# Patient Record
Sex: MALE | Employment: FULL TIME | URBAN - METROPOLITAN AREA
[De-identification: names, ages, dates, MRNs, and addresses within clinical notes are randomized per-mention and may not be internally consistent; named-entity substitution may affect disease eponyms.]

---

## 2021-09-22 ENCOUNTER — ANCILLARY PROCEDURE (OUTPATIENT)
Dept: GENERAL RADIOLOGY | Facility: CLINIC | Age: 46
End: 2021-09-22
Attending: PHYSICIAN ASSISTANT
Payer: COMMERCIAL

## 2021-09-22 ENCOUNTER — OFFICE VISIT (OUTPATIENT)
Dept: FAMILY MEDICINE | Facility: CLINIC | Age: 46
End: 2021-09-22
Payer: COMMERCIAL

## 2021-09-22 VITALS
WEIGHT: 176.6 LBS | DIASTOLIC BLOOD PRESSURE: 81 MMHG | TEMPERATURE: 96.6 F | SYSTOLIC BLOOD PRESSURE: 124 MMHG | RESPIRATION RATE: 14 BRPM | HEIGHT: 73 IN | OXYGEN SATURATION: 98 % | BODY MASS INDEX: 23.4 KG/M2 | HEART RATE: 55 BPM

## 2021-09-22 DIAGNOSIS — R07.9 CHEST PAIN, UNSPECIFIED TYPE: ICD-10-CM

## 2021-09-22 DIAGNOSIS — Z13.220 SCREENING FOR HYPERLIPIDEMIA: ICD-10-CM

## 2021-09-22 DIAGNOSIS — R07.9 CHEST PAIN, UNSPECIFIED TYPE: Primary | ICD-10-CM

## 2021-09-22 LAB
ANION GAP SERPL CALCULATED.3IONS-SCNC: 3 MMOL/L (ref 3–14)
BUN SERPL-MCNC: 14 MG/DL (ref 7–30)
CALCIUM SERPL-MCNC: 9.3 MG/DL (ref 8.5–10.1)
CHLORIDE BLD-SCNC: 106 MMOL/L (ref 94–109)
CHOLEST SERPL-MCNC: 174 MG/DL
CO2 SERPL-SCNC: 30 MMOL/L (ref 20–32)
CREAT SERPL-MCNC: 1.07 MG/DL (ref 0.66–1.25)
ERYTHROCYTE [DISTWIDTH] IN BLOOD BY AUTOMATED COUNT: 12.7 % (ref 10–15)
FASTING STATUS PATIENT QL REPORTED: YES
GFR SERPL CREATININE-BSD FRML MDRD: 83 ML/MIN/1.73M2
GLUCOSE BLD-MCNC: 87 MG/DL (ref 70–99)
HCT VFR BLD AUTO: 46.5 % (ref 40–53)
HDLC SERPL-MCNC: 56 MG/DL
HGB BLD-MCNC: 15.7 G/DL (ref 13.3–17.7)
LDLC SERPL CALC-MCNC: 100 MG/DL
MCH RBC QN AUTO: 30.7 PG (ref 26.5–33)
MCHC RBC AUTO-ENTMCNC: 33.8 G/DL (ref 31.5–36.5)
MCV RBC AUTO: 91 FL (ref 78–100)
NONHDLC SERPL-MCNC: 118 MG/DL
PLATELET # BLD AUTO: 193 10E3/UL (ref 150–450)
POTASSIUM BLD-SCNC: 4.9 MMOL/L (ref 3.4–5.3)
RBC # BLD AUTO: 5.12 10E6/UL (ref 4.4–5.9)
SODIUM SERPL-SCNC: 139 MMOL/L (ref 133–144)
TRIGL SERPL-MCNC: 89 MG/DL
TROPONIN I SERPL-MCNC: <0.015 UG/L (ref 0–0.04)
WBC # BLD AUTO: 4.2 10E3/UL (ref 4–11)

## 2021-09-22 PROCEDURE — 84484 ASSAY OF TROPONIN QUANT: CPT | Performed by: PHYSICIAN ASSISTANT

## 2021-09-22 PROCEDURE — 36415 COLL VENOUS BLD VENIPUNCTURE: CPT | Performed by: PHYSICIAN ASSISTANT

## 2021-09-22 PROCEDURE — 85027 COMPLETE CBC AUTOMATED: CPT | Performed by: PHYSICIAN ASSISTANT

## 2021-09-22 PROCEDURE — 80048 BASIC METABOLIC PNL TOTAL CA: CPT | Performed by: PHYSICIAN ASSISTANT

## 2021-09-22 PROCEDURE — 93000 ELECTROCARDIOGRAM COMPLETE: CPT | Performed by: PHYSICIAN ASSISTANT

## 2021-09-22 PROCEDURE — 71046 X-RAY EXAM CHEST 2 VIEWS: CPT | Performed by: RADIOLOGY

## 2021-09-22 PROCEDURE — 80061 LIPID PANEL: CPT | Performed by: PHYSICIAN ASSISTANT

## 2021-09-22 PROCEDURE — 99204 OFFICE O/P NEW MOD 45 MIN: CPT | Performed by: PHYSICIAN ASSISTANT

## 2021-09-22 ASSESSMENT — PAIN SCALES - GENERAL: PAINLEVEL: NO PAIN (1)

## 2021-09-22 ASSESSMENT — MIFFLIN-ST. JEOR: SCORE: 1737.92

## 2021-09-22 NOTE — PATIENT INSTRUCTIONS
Darrick Fry,    Thank you for allowing [facility] to manage your care.    I am unsure of the cause of your symptoms, but your exam is reassuring. We will see what our workup shows.     If you develop worsening/changing symptoms at any time, please call 911 or go to the emergency department for evaluation.    I ordered some lab work, please go to the laboratory to get your studies.    I ordered some xrays, please go to our radiology department to get your xrays.    Please allow 1-2 business days for our office to contact you in regards to your laboratory/radiological studies.  If not done so, I encourage you to login into [employer] ([email or web address] to review your results as well.     For your pain, please use Ibuprofen 400mg four times daily with food. Between ibuprofen doses, you may use Tylenol 650mg.     Max acetaminophen (Tylenol) 4,000mg/24 hours  Max ibuprofen 3,200mg/24 hours      If you have any questions or concerns, please feel free to call us at [phone]    Sincerely,    Edin Severino PA-C    Did you know?      You can schedule a video visit for follow-up appointments as well as future appointments for certain conditions.  Please see the below link.     [email or web address]    If you have not already done so,  I encourage you to sign up for [facility] ([email or web address]).  This will allow you to review your results, securely communicate with a provider, and schedule virtual visits as well.      Patient Education     Uncertain Causes of Chest Pain    Chest pain can happen for a number of reasons. Sometimes the cause can't be determined. If your condition does not seem serious, and your pain does not appear to be coming from your heart, your healthcare provider may recommend watching it closely. Sometimes the signs of a serious problem take more time to appear. Many problems not related to your heart can cause chest pain.  These include:    Musculoskeletal. Costochondritis is an inflammation of the tissues around the ribs that can occur from trauma or overuse injuries, or a strain of the muscles of the chest wall    Respiratory. Pneumonia, collapsed lung (pneumothorax), or inflammation of the lining of the chest and lungs (pleurisy)    Gastrointestinal. Esophageal reflux, heartburn, ulcers, or gallbladder disease    Anxiety and panic disorders    Nerve compression and inflammation    Rare miscellaneous problems such as aortic aneurysm (a swelling of the large artery coming out of the heart) or pulmonary embolism (a blood clot in the lungs)  Home care  After your visit, follow these recommendations:    Rest today and avoid strenuous activity.    Take any prescribed medicine as directed.    Be aware of any recurrent chest pain and notice any changes  Follow-up care  Follow up with your healthcare provider if you do not start to feel better within 24 hours, or as advised.  Call 911  Call 911 if any of these occur:    A change in the type of pain: if it feels different, becomes more severe, lasts longer, or begins to spread into your shoulder, arm, neck, jaw or back    Shortness of breath or increased pain with breathing    Weakness, dizziness, or fainting    Rapid heart beat    Crushing sensation in your chest  When to seek medical advice  Call your healthcare provider right away if any of the following occur:    Cough with dark colored sputum (phlegm) or blood    Fever of 100.4 F (38 C) or higher, or as directed by your healthcare provider    Swelling, pain or redness in one leg  Nisreen last reviewed this educational content on 5/1/2018 2000-2021 [employer]. All rights reserved. This information is not intended as a substitute for professional medical care. Always follow your healthcare professional's instructions.      We are continuing to schedule all people age 12 and older for COVID-19 vaccines (patients age  12-17 can only use the Pfizer vaccine).    After Tuesday, Aug. 24, we are offering third doses of the Pfizer and Moderna COVID-19 vaccines to moderately and severely immunocompromised patients. Qualified patients can schedule their third dose vaccine appointment via [facility] or by walking in to one of our retail pharmacies to receive the vaccine - no appointment needed. We will also be sending messages in [facility] or through the mail to patients that we have identified as immunocompromised per CDC criteria to allow them easy access to schedule their appointment.    We are not yet providing third shots of COVID-19 vaccine to patients who are not immunocompromised. Please check back in the coming days for more information on when these may become available.       To schedule your 1st dose appointment, please log in to [facility] using this link to see when and where we have openings.     To schedule your additional dose appointment for immunocompromised patients, please log in to [facility] [employer] using this link to see when and where we have openings.    If you have technical difficulty using [employer], call [phone], option 1 for assistance.    More information about vaccine effectiveness at reducing spread of disease, hospitalizations, and death as well as vaccine safety and answers to other questions can be found on our website: [email or web address].

## 2021-09-22 NOTE — PROGRESS NOTES
Assessment & Plan   Problem List Items Addressed This Visit     None      Visit Diagnoses     Chest pain    -  Primary    Relevant Orders    EKG 12-lead complete w/read - Clinics (Completed)    Lipid panel reflex to direct LDL Fasting    XR Chest 2 Views (Completed)    CBC with platelets (Completed)    Basic metabolic panel  (Ca, Cl, CO2, Creat, Gluc, K, Na, BUN)    Troponin I    Screening for hyperlipidemia        Relevant Orders    Lipid panel reflex to direct LDL Fasting    Chest pain, unspecified type        Relevant Orders    Lipid panel reflex to direct LDL Fasting    XR Chest 2 Views (Completed)    CBC with platelets (Completed)    Basic metabolic panel  (Ca, Cl, CO2, Creat, Gluc, K, Na, BUN)    Troponin I         Impression is right lateral chest pain from uncertain but likely nonemergent etiology.  Patient states his discomfort is resolving and he considered not keeping this appointment today.  EKG was abnormal but I did speak with Dr. Ambriz of cardiology who was not concerned with the tracing.  CBC and chest x-ray show no evidence of anemia or worrisome cardiopulmonary finding.  Chemistry and troponin are pending, but I have low suspicion for acute coronary syndrome.  We did discuss screening for PE based on D-dimer, however the patient is low risk for PE on Wells criteria and is PERC rule negative.  He would prefer to not do this at this time.  I feel this is reasonable.  He did recently begin a new workout routine and this may be the cause of his symptoms in the form of musculoskeletal strain or other cause.  We did discuss the limitations of a clinic work-up and he acknowledged understanding of the risks of not going to the emergency department for his chest discomfort.  Follow up in one week if not improving.    Complete history and physical exam as below. AF with normal VS.    DDx and Dx discussed with and explained to the pt to their satisfaction.  All questions were answered at this time. Pt  "expressed understanding of and agreement with this dx, tx, and plan. No further workup warranted and standard medication warnings given. I have given the patient a list of pertinent indications for re-evaluation. Will go to the Emergency Department if symptoms worsen or new concerning symptoms arise. Patient left in no apparent distress.     35 minutes spent on the date of the encounter doing chart review, history and exam, documentation and further activities per the note    See Patient Instructions    Return in about 1 week (around 9/29/2021) for a recheck of your symptoms if not improving, or call 911/go to an ER anytime if worsening.    SIMI Viv[facility] [place] Fry is a 46 year old who presents for the following health issues     HPI     Chest Pain  Onset/Duration: 3 weeks ago  Description:   Location: underneath ribs, right side  Character: discomfort  Radiation: None  Duration: depending on breathing   Intensity: moderate  Progression of Symptoms: improving and intermittent  Accompanying Signs & Symptoms:  Shortness of breath: no  Sweating: no  Nausea/vomiting: no  Lightheadedness: no  Palpitations: no  Fever/Chills: no  Cough: no           Heartburn: no  History:   Family history of heart disease: grandpa heart attack  Tobacco use: YES- cigars  Previous similar symptoms: no   Precipitating factors:   Worse with exertion: no  Worse with deep breaths: YES           Related to eating: no           Better with burping: no  Alleviating factors: No  Therapies tried and outcome: No    No change in exercise tolerance. Pain not exertional. No history of PE/DVT, hormone use, CA. Had COVID 19 in April.     Review of Systems   Constitutional, HEENT, cardiovascular, pulmonary, gi and gu systems are negative, except as otherwise noted.      Objective    /81   Pulse 55   Temp (!) 96.6  F (35.9  C) (Tympanic)   Resp 14   Ht 1.851 m (6' 0.87\")   Wt 80.1 kg (176 lb " 9.6 oz)   SpO2 98%   BMI 23.38 kg/m    Body mass index is 23.38 kg/m .  Physical Exam  Vitals and nursing note reviewed.   Constitutional:       General: He is not in acute distress.     Appearance: He is not ill-appearing or diaphoretic.   HENT:      Head: Normocephalic and atraumatic.      Mouth/Throat:      Mouth: Mucous membranes are moist.   Eyes:      Conjunctiva/sclera: Conjunctivae normal.   Cardiovascular:      Rate and Rhythm: Normal rate and regular rhythm.      Heart sounds: Normal heart sounds. No murmur heard.   No friction rub. No gallop.       Comments: 2+ symmetric radial/PT pulses. No LE edema or tenderness.  Pulmonary:      Effort: Pulmonary effort is normal. No respiratory distress.      Breath sounds: Normal breath sounds. No stridor. No wheezing, rhonchi or rales.   Abdominal:      General: Bowel sounds are normal. There is no distension.      Palpations: Abdomen is soft. There is no mass.      Tenderness: There is no abdominal tenderness. There is no guarding or rebound.      Hernia: No hernia is present.   Skin:     General: Skin is warm and dry.   Neurological:      General: No focal deficit present.      Mental Status: He is alert. Mental status is at baseline.   Psychiatric:         Mood and Affect: Mood normal.         Behavior: Behavior normal.          Results for orders placed or performed in visit on 09/22/21   XR Chest 2 Views     Status: None    Narrative    XR CHEST 2 VW 9/22/2021 8:59 AM    HISTORY: Chest pain, unspecified type    COMPARISON: 2/2/2012      Impression    IMPRESSION: Clear lungs. No pleural effusion or pneumothorax. The  cardiac and mediastinal silhouettes are normal.    KIARRA CROUCH MD         SYSTEM ID:  [id number]   Results for orders placed or performed in visit on 09/22/21   CBC with platelets     Status: Normal   Result Value Ref Range    WBC Count 4.2 4.0 - 11.0 10e3/uL    RBC Count 5.12 4.40 - 5.90 10e6/uL    Hemoglobin 15.7 13.3 - 17.7 g/dL    Hematocrit  46.5 40.0 - 53.0 %    MCV 91 78 - 100 fL    MCH 30.7 26.5 - 33.0 pg    MCHC 33.8 31.5 - 36.5 g/dL    RDW 12.7 10.0 - 15.0 %    Platelet Count 193 150 - 450 10e3/uL     EKG: sinus rhythm with abnormal ST contour in leads I and II    Troponin pending

## 2021-09-25 ENCOUNTER — HEALTH MAINTENANCE LETTER (OUTPATIENT)
Age: 46
End: 2021-09-25

## 2022-12-01 ENCOUNTER — TELEPHONE (OUTPATIENT)
Dept: FAMILY MEDICINE | Facility: CLINIC | Age: 47
End: 2022-12-01

## 2022-12-01 NOTE — TELEPHONE ENCOUNTER
Patient calling to report what feels like a pulled muscle (right side of back, just under his rib cage).    Patient states the pain is a mild constant ache that he feels more when taking deep breaths, turning in bed or twisting.    Patient unaware of cause; denies injury, or abnormal appearance; denies any acute illness.    Patient is concerned because it has been over one month and pain has not gone away.  Scheduled with same day provider tomorrow.    Patient verbalized agreement and understanding.  Smitha Phillips RN

## 2022-12-02 ENCOUNTER — VIRTUAL VISIT (OUTPATIENT)
Dept: FAMILY MEDICINE | Facility: CLINIC | Age: 47
End: 2022-12-02
Payer: COMMERCIAL

## 2022-12-02 DIAGNOSIS — R10.9 RIGHT FLANK PAIN: Primary | ICD-10-CM

## 2022-12-02 DIAGNOSIS — M54.6 RIGHT-SIDED THORACIC BACK PAIN, UNSPECIFIED CHRONICITY: ICD-10-CM

## 2022-12-02 PROCEDURE — 99213 OFFICE O/P EST LOW 20 MIN: CPT | Mod: 95 | Performed by: PHYSICIAN ASSISTANT

## 2022-12-02 ASSESSMENT — ENCOUNTER SYMPTOMS
SHORTNESS OF BREATH: 0
HEMATURIA: 0
NERVOUS/ANXIOUS: 0
DIAPHORESIS: 0
COUGH: 0
DYSURIA: 0
FEVER: 0
FREQUENCY: 0
UNEXPECTED WEIGHT CHANGE: 0
ABDOMINAL PAIN: 0
LIGHT-HEADEDNESS: 0

## 2022-12-02 NOTE — PATIENT INSTRUCTIONS
Braulio Fry,     As your low back/flank pain has been ongoing for 2 months and has slightly worsened, I think we should complete some lab work for further evaluation.  I have placed lab orders.  Please call the [facility] at [phone] to schedule your lab appointment.  We will send results and next steps once available.     I would recommend trying ibuprofen a few times to see if this helps with your symptoms.  If you develop any severe symptoms such as chest pain, difficulty breathing, severe abdominal pain, fevers, or any other severe symptoms, please go to the emergency department.    Reach out with questions or concerns.    Take Care,  Marlen Navas PA-C

## 2022-12-02 NOTE — PROGRESS NOTES
Ang is a 47 year old who is being evaluated via a billable video visit.      How would you like to obtain your AVS? MyChart  If the video visit is dropped, the invitation should be resent by: Text to cell phone: [phone]  Will anyone else be joining your video visit? No      Assessment & Plan     Right flank pain  Right-sided thoracic back pain, unspecified chronicity  Patient is a 47-year-old male who presents to virtual visit due to right lower back/flank pain ongoing intermittently x2 months and slightly worsening.  Patient notes that occurs with movement.  No known injury.  Patient is generally healthy and is able to do all daily activities including running.  As pain has slightly worsened over the last 2 months and no known injury, low suspicion for muscle strain or spasm.  Differential diagnosis is broad includes kidney stone, hepatic, renal, gallbladder, pancreatic abnormalities.  Will complete labs for further evaluation.  Recommended trial of ibuprofen for pain management.  Discussed symptoms that warrant urgent/emergent follow-up.  - UA Macro with Reflex to Micro and Culture - lab collect; Future  - CBC with platelets; Future  - Comprehensive metabolic panel (BMP + Alb, Alk Phos, ALT, AST, Total. Bili, TP); Future  - Lipase; Future      See Patient Instructions    Return in about 1 week (around 12/9/2022), or if symptoms worsen or fail to improve.    Marlen Navas PA-C  [facility] [place] Fry is a 47 year old, presenting for the following health issues:  Musculoskeletal Problem      History of Present Illness       Reason for visit:  Localized mild discomfort in back  Symptom onset:  More than a month  Symptoms include:  Mild, but persistent, discomfort in right side of back, just below rib cage.  Symptom intensity:  Mild  Symptom progression:  Staying the same  Had these symptoms before:  No  What makes it worse:  Moving.  What makes it better:  Not moving    He  eats 2-3 servings of fruits and vegetables daily.He consumes 0 sweetened beverage(s) daily.He exercises with enough effort to increase his heart rate 30 to 60 minutes per day.  He exercises with enough effort to increase his heart rate 5 days per week.   He is taking medications regularly.     Patient notes he has localized back discomfort, no specifically pain, that is in the middle of the right back below the rib cage ongoing for over one month. Pain is intermittent, but does not affect daily activity. Pain occurs with twisting or movement. Pain is slight worse than when it started. No recent Uri's. No known injury. No prior surgeries. Patient notes pain was more pronounced after having three alcoholic drinks at [facility]. No family history of pancreatic cancer. No history of kidney stones.    Review of Systems   Constitutional: Negative for diaphoresis, fever and unexpected weight change.   HENT: Negative for congestion.    Respiratory: Negative for cough and shortness of breath.    Cardiovascular: Negative for chest pain.   Gastrointestinal: Negative for abdominal pain.   Genitourinary: Negative for dysuria, frequency, hematuria and urgency.   Skin: Negative for rash.   Neurological: Negative for light-headedness.   Psychiatric/Behavioral: The patient is not nervous/anxious.           Objective           Vitals:  No vitals were obtained today due to virtual visit.    Physical Exam   GENERAL: Healthy, alert and no distress  EYES: Eyes grossly normal to inspection.  No discharge or erythema, or obvious scleral/conjunctival abnormalities.  RESP: No audible wheeze, cough, or visible cyanosis.  No visible retractions or increased work of breathing.    SKIN: Visible skin clear. No significant rash, abnormal pigmentation or lesions.  NEURO: Cranial nerves grossly intact.  Mentation and speech appropriate for age.  PSYCH: Mentation appears normal, affect normal/bright, judgement and insight intact, normal speech and  appearance well-groomed.          Video-Visit Details    Video Start Time: 2:40 PM    Type of service:  Video Visit    Video End Time:2:55 PM    Originating Location (pt. Location): Home    Distant Location (provider location):  On-site    Platform used for Video Visit: Kaleb

## 2022-12-06 ENCOUNTER — LAB (OUTPATIENT)
Dept: LAB | Facility: CLINIC | Age: 47
End: 2022-12-06
Payer: COMMERCIAL

## 2022-12-06 DIAGNOSIS — M54.6 RIGHT-SIDED THORACIC BACK PAIN, UNSPECIFIED CHRONICITY: ICD-10-CM

## 2022-12-06 DIAGNOSIS — R10.9 RIGHT FLANK PAIN: ICD-10-CM

## 2022-12-06 LAB
ALBUMIN SERPL-MCNC: 3.7 G/DL (ref 3.4–5)
ALBUMIN UR-MCNC: NEGATIVE MG/DL
ALP SERPL-CCNC: 92 U/L (ref 40–150)
ALT SERPL W P-5'-P-CCNC: 28 U/L (ref 0–70)
ANION GAP SERPL CALCULATED.3IONS-SCNC: 3 MMOL/L (ref 3–14)
APPEARANCE UR: CLEAR
AST SERPL W P-5'-P-CCNC: 22 U/L (ref 0–45)
BILIRUB SERPL-MCNC: 0.4 MG/DL (ref 0.2–1.3)
BILIRUB UR QL STRIP: NEGATIVE
BUN SERPL-MCNC: 19 MG/DL (ref 7–30)
CALCIUM SERPL-MCNC: 8.8 MG/DL (ref 8.5–10.1)
CHLORIDE BLD-SCNC: 108 MMOL/L (ref 94–109)
CO2 SERPL-SCNC: 30 MMOL/L (ref 20–32)
COLOR UR AUTO: YELLOW
CREAT SERPL-MCNC: 1 MG/DL (ref 0.66–1.25)
ERYTHROCYTE [DISTWIDTH] IN BLOOD BY AUTOMATED COUNT: 12.7 % (ref 10–15)
GFR SERPL CREATININE-BSD FRML MDRD: >90 ML/MIN/1.73M2
GLUCOSE BLD-MCNC: 89 MG/DL (ref 70–99)
GLUCOSE UR STRIP-MCNC: NEGATIVE MG/DL
HCT VFR BLD AUTO: 42.5 % (ref 40–53)
HGB BLD-MCNC: 14.1 G/DL (ref 13.3–17.7)
HGB UR QL STRIP: NEGATIVE
KETONES UR STRIP-MCNC: NEGATIVE MG/DL
LEUKOCYTE ESTERASE UR QL STRIP: NEGATIVE
LIPASE SERPL-CCNC: 109 U/L (ref 73–393)
MCH RBC QN AUTO: 30.5 PG (ref 26.5–33)
MCHC RBC AUTO-ENTMCNC: 33.2 G/DL (ref 31.5–36.5)
MCV RBC AUTO: 92 FL (ref 78–100)
NITRATE UR QL: NEGATIVE
PH UR STRIP: 6 [PH] (ref 5–7)
PLATELET # BLD AUTO: 206 10E3/UL (ref 150–450)
POTASSIUM BLD-SCNC: 4.7 MMOL/L (ref 3.4–5.3)
PROT SERPL-MCNC: 7.2 G/DL (ref 6.8–8.8)
RBC # BLD AUTO: 4.63 10E6/UL (ref 4.4–5.9)
SODIUM SERPL-SCNC: 141 MMOL/L (ref 133–144)
SP GR UR STRIP: 1.01 (ref 1–1.03)
UROBILINOGEN UR STRIP-ACNC: 0.2 E.U./DL
WBC # BLD AUTO: 6.6 10E3/UL (ref 4–11)

## 2022-12-06 PROCEDURE — 36415 COLL VENOUS BLD VENIPUNCTURE: CPT

## 2022-12-06 PROCEDURE — 81003 URINALYSIS AUTO W/O SCOPE: CPT

## 2022-12-06 PROCEDURE — 80053 COMPREHEN METABOLIC PANEL: CPT

## 2022-12-06 PROCEDURE — 85027 COMPLETE CBC AUTOMATED: CPT

## 2022-12-06 PROCEDURE — 83690 ASSAY OF LIPASE: CPT

## 2023-04-22 ENCOUNTER — HEALTH MAINTENANCE LETTER (OUTPATIENT)
Age: 48
End: 2023-04-22

## 2023-08-14 ENCOUNTER — OFFICE VISIT (OUTPATIENT)
Dept: FAMILY MEDICINE | Facility: CLINIC | Age: 48
End: 2023-08-14
Payer: COMMERCIAL

## 2023-08-14 VITALS
WEIGHT: 194.4 LBS | DIASTOLIC BLOOD PRESSURE: 72 MMHG | TEMPERATURE: 97.8 F | HEART RATE: 54 BPM | SYSTOLIC BLOOD PRESSURE: 104 MMHG | OXYGEN SATURATION: 97 % | RESPIRATION RATE: 14 BRPM | HEIGHT: 73 IN | BODY MASS INDEX: 25.76 KG/M2

## 2023-08-14 DIAGNOSIS — Z71.89 ADVANCE DIRECTIVE DISCUSSED WITH PATIENT: ICD-10-CM

## 2023-08-14 DIAGNOSIS — L30.8 OTHER ECZEMA: Primary | ICD-10-CM

## 2023-08-14 PROCEDURE — 90715 TDAP VACCINE 7 YRS/> IM: CPT | Performed by: PHYSICIAN ASSISTANT

## 2023-08-14 PROCEDURE — 90471 IMMUNIZATION ADMIN: CPT | Performed by: PHYSICIAN ASSISTANT

## 2023-08-14 PROCEDURE — 99213 OFFICE O/P EST LOW 20 MIN: CPT | Mod: 25 | Performed by: PHYSICIAN ASSISTANT

## 2023-08-14 RX ORDER — TRIAMCINOLONE ACETONIDE 1 MG/G
CREAM TOPICAL 2 TIMES DAILY
Qty: 30 G | Refills: 3 | Status: SHIPPED | OUTPATIENT
Start: 2023-08-14

## 2023-08-14 ASSESSMENT — PAIN SCALES - GENERAL: PAINLEVEL: NO PAIN (0)

## 2023-08-14 NOTE — PATIENT INSTRUCTIONS
Darrick Fry,    Thank you for allowing [facility] to manage your care.    You likely have idiopathic eczema. The triamcinolone should work well, but on sensitive areas, you may want to use hydrocortisone 1% (Cortaid/Cortizone 10) instead. Try using Vaseline or moisturizing creams to prevent flares.    If you develop worsening/changing symptoms at any time, please be seen again in clinic, urgent care, or go to the emergency department for evaluation as we discussed.    I sent your prescriptions to your pharmacy.    If you have any questions or concerns, please feel free to call us at [phone]    Sincerely,    Edin Severino PA-C    Did you know?      You can schedule a video visit for follow-up appointments as well as future appointments for certain conditions.  Please see the below link.     [email or web address]    If you have not already done so,  I encourage you to sign up for [facility] ([email or web address]).  This will allow you to review your results, securely communicate with a provider, and schedule virtual visits as well.

## 2023-08-14 NOTE — PROGRESS NOTES
Prior to immunization administration, verified patients identity using patient s name and date of birth. Please see Immunization Activity for additional information.     Screening Questionnaire for Adult Immunization    Are you sick today?   No   Do you have allergies to medications, food, a vaccine component or latex?   No   Have you ever had a serious reaction after receiving a vaccination?   No   Do you have a long-term health problem with heart, lung, kidney, or metabolic disease (e.g., diabetes), asthma, a blood disorder, no spleen, complement component deficiency, a cochlear implant, or a spinal fluid leak?  Are you on long-term aspirin therapy?   No   Do you have cancer, leukemia, HIV/AIDS, or any other immune system problem?   No   Do you have a parent, brother, or sister with an immune system problem?   No   In the past 3 months, have you taken medications that affect  your immune system, such as prednisone, other steroids, or anticancer drugs; drugs for the treatment of rheumatoid arthritis, Crohn s disease, or psoriasis; or have you had radiation treatments?   No   Have you had a seizure, or a brain or other nervous system problem?   No   During the past year, have you received a transfusion of blood or blood    products, or been given immune (gamma) globulin or antiviral drug?   No   For women: Are you pregnant or is there a chance you could become       pregnant during the next month?   No   Have you received any vaccinations in the past 4 weeks?   No     Immunization questionnaire answers were all negative.      Patient instructed to remain in clinic for 15 minutes afterwards, and to report any adverse reactions.     Screening performed by Hayden Mckeon CMA on 8/14/2023 at 6:53 AM.      Assessment & Plan   Problem List Items Addressed This Visit          Musculoskeletal and Integumentary    Eczema - Primary    Relevant Medications    triamcinolone (KENALOG) 0.1 % external cream     Other Visit  "Diagnoses       Advance directive discussed with patient               Likely eczematous process. Will treat with the above. Encouraged him to use lower potency hydrocortisone otc option for groin, axillae, face and other sensitive areas and treat problem areas prophylactically with moisturizing cream, Vaseline, etc. Tetanus updated. He should follow up with a primary to establish care and for preventative medicine annually. Referral made.    Complete history and physical exam as below. Afebrile with normal vital signs aside from mild asymptomatic bradycardia, baseline per chart.    DDx and Dx discussed with and explained to the pt to their satisfaction.  All questions were answered at this time. Pt expressed understanding of and agreement with this dx, tx, and plan. No further workup warranted and standard medication warnings given. I have given the patient a list of pertinent indications for re-evaluation. Will go to the Emergency Department if symptoms worsen or new concerning symptoms arise. Patient left in no apparent distress.    Prescription drug management  BMI:   Estimated body mass index is 25.63 kg/m  as calculated from the following:    Height as of this encounter: 1.855 m (6' 1.03\").    Weight as of this encounter: 88.2 kg (194 lb 6.4 oz).     See Patient Instructions    SIMI Vivar [facility] [place] Fry is a 48 year old, presenting for the following health issues:  Rash (Triamcinolone cream fill)        2023     6:49 AM   Additional Questions   Roomed by Hayden URIARTE   Accompanied by NA         2023     6:49 AM   Patient Reported Additional Medications   Patient reports taking the following new medications NA       History of Present Illness       Reason for visit:  I need a refill of an  prescription to treat eczema.He exercises with enough effort to increase his heart rate 30 to 60 minutes per day.  He exercises with enough effort to increase " "his heart rate 5 days per week.      Has been using 0.1% Kenalog on patches of eczema intermittently for years with good result. Has a smal patch in his right groin and occasionally gets a spot no the tip of his penis. No fevers, sob, other symptoms.    Review of Systems   Skin:  Positive for rash.      Constitutional, HEENT, cardiovascular, pulmonary, skin and gu systems are negative, except as otherwise noted.      Objective    /72   Pulse 54   Temp 97.8  F (36.6  C) (Temporal)   Resp 14   Ht 1.855 m (6' 1.03\")   Wt 88.2 kg (194 lb 6.4 oz)   SpO2 97%   BMI 25.63 kg/m    Body mass index is 25.63 kg/m .  Physical Exam  Vitals and nursing note reviewed.   Constitutional:       General: He is not in acute distress.     Appearance: Normal appearance. He is not diaphoretic.   HENT:      Head: Normocephalic and atraumatic.      Nose: Nose normal.   Eyes:      Conjunctiva/sclera: Conjunctivae normal.   Pulmonary:      Effort: Pulmonary effort is normal. No respiratory distress.   Skin:     General: Skin is dry.      Coloration: Skin is not jaundiced or pale.   Neurological:      General: No focal deficit present.      Mental Status: He is alert. Mental status is at baseline.   Psychiatric:         Mood and Affect: Mood normal.         Behavior: Behavior normal.                            "

## 2024-01-03 ENCOUNTER — OFFICE VISIT (OUTPATIENT)
Dept: FAMILY MEDICINE | Facility: CLINIC | Age: 49
End: 2024-01-03
Payer: COMMERCIAL

## 2024-01-03 VITALS
TEMPERATURE: 97.2 F | DIASTOLIC BLOOD PRESSURE: 86 MMHG | SYSTOLIC BLOOD PRESSURE: 129 MMHG | HEART RATE: 52 BPM | HEIGHT: 73 IN | WEIGHT: 201 LBS | BODY MASS INDEX: 26.64 KG/M2 | OXYGEN SATURATION: 100 %

## 2024-01-03 DIAGNOSIS — H69.92 DYSFUNCTION OF LEFT EUSTACHIAN TUBE: Primary | ICD-10-CM

## 2024-01-03 DIAGNOSIS — H61.22 EXCESSIVE CERUMEN IN LEFT EAR CANAL: ICD-10-CM

## 2024-01-03 PROCEDURE — 99213 OFFICE O/P EST LOW 20 MIN: CPT | Performed by: NURSE PRACTITIONER

## 2024-01-03 RX ORDER — METHYLPREDNISOLONE 4 MG
TABLET, DOSE PACK ORAL
Qty: 21 TABLET | Refills: 0 | Status: SHIPPED | OUTPATIENT
Start: 2024-01-03

## 2024-01-03 ASSESSMENT — PAIN SCALES - GENERAL: PAINLEVEL: NO PAIN (0)

## 2024-01-03 NOTE — PATIENT INSTRUCTIONS
Ear pressure- I think most likely eustachian tube dysfunction which can happen after an illness. Start prescription steroid.  I would also recommend taking a decongestant such as Sudafed for the next several days in a row.  If not improving with treatment, we can refer you to ENT.    Wax- Try OTC product called Debrox.

## 2024-01-03 NOTE — PROGRESS NOTES
"  Assessment & Plan     Dysfunction of left eustachian tube  Symptoms started following viral illness. I think less likely related to excessive cerumen.  Discussed starting OTC decongestant along with medrol Dosepak.  Referral to ENT if not improving.   - methylPREDNISolone (MEDROL DOSEPAK) 4 MG tablet therapy pack; Follow Package Directions    Excessive cerumen in left ear canal  Ear wash was unsuccessful.  Discussed trying OTC Debrox at home.  Follow-up as needed.   - REMOVE IMPACTED CERUMEN     Nicotine/Tobacco Cessation:  He reports that he has been smoking cigars. He has never been exposed to tobacco smoke. He has never used smokeless tobacco.  Nicotine/Tobacco Cessation Plan:   Not discussed      BMI:   Estimated body mass index is 26.52 kg/m  as calculated from the following:    Height as of this encounter: 1.854 m (6' 1\").    Weight as of this encounter: 91.2 kg (201 lb).       Supriya Salomon [facility] CORIN Fry is a 48 year old, presenting for the following health issues:  Ear Problem        1/3/2024     9:40 AM   Additional Questions   Roomed by crystal   Accompanied by self       History of Present Illness       Reason for visit:  Ear congestion  Symptom onset:  3-4 weeks ago    He eats 2-3 servings of fruits and vegetables daily.He consumes 0 sweetened beverage(s) daily.He exercises with enough effort to increase his heart rate 30 to 60 minutes per day.  He exercises with enough effort to increase his heart rate 5 days per week.   He is taking medications regularly.       Left ear problem.   Started after a viral illness 12/18/23.  Felt like a head cold.  Most of his symptoms went away within a few days but his left ear symptoms persisted.  Feels clogged, pressure. Not painful.  Not sure if hearing affected.     Has tried plugging nose and blowing and also benadryl, not effective.   Had similar episode in the past, also following illness, that lasted several " "months.     Review of Systems   HENT:  Positive for ear pain.             Objective    /86   Pulse 52   Temp 97.2  F (36.2  C)   Ht 1.854 m (6' 1\")   Wt 91.2 kg (201 lb)   SpO2 100%   BMI 26.52 kg/m    Body mass index is 26.52 kg/m .  Physical Exam   GENERAL: healthy, alert and no distress  EYES: Eyes grossly normal to inspection, PERRL and conjunctivae and sclerae normal  HENT: normal cephalic/atraumatic, right ear: normal: no effusions, no erythema, normal landmarks, and left ear: excessive cerumen, unable to visualize TM  RESP: breathing unlabored  MS: no gross musculoskeletal defects noted, no edema  SKIN: no suspicious lesions or rashes  NEURO: Normal strength and tone, mentation intact and speech normal  PSYCH: mentation appears normal, affect normal/bright                "

## 2024-06-29 ENCOUNTER — HEALTH MAINTENANCE LETTER (OUTPATIENT)
Age: 49
End: 2024-06-29

## 2025-07-13 ENCOUNTER — HEALTH MAINTENANCE LETTER (OUTPATIENT)
Age: 50
End: 2025-07-13